# Patient Record
Sex: MALE | Race: WHITE | NOT HISPANIC OR LATINO | Employment: UNEMPLOYED | URBAN - METROPOLITAN AREA
[De-identification: names, ages, dates, MRNs, and addresses within clinical notes are randomized per-mention and may not be internally consistent; named-entity substitution may affect disease eponyms.]

---

## 2024-11-12 ENCOUNTER — APPOINTMENT (OUTPATIENT)
Dept: OTOLARYNGOLOGY | Facility: CLINIC | Age: 54
End: 2024-11-12
Payer: COMMERCIAL

## 2024-11-12 ENCOUNTER — APPOINTMENT (OUTPATIENT)
Dept: AUDIOLOGY | Facility: CLINIC | Age: 54
End: 2024-11-12
Payer: COMMERCIAL

## 2024-11-19 ENCOUNTER — APPOINTMENT (OUTPATIENT)
Dept: AUDIOLOGY | Facility: CLINIC | Age: 54
End: 2024-11-19
Payer: COMMERCIAL

## 2024-11-19 ENCOUNTER — APPOINTMENT (OUTPATIENT)
Dept: OTOLARYNGOLOGY | Facility: CLINIC | Age: 54
End: 2024-11-19
Payer: COMMERCIAL

## 2024-11-19 VITALS — BODY MASS INDEX: 27.77 KG/M2 | WEIGHT: 205 LBS | HEIGHT: 72 IN

## 2024-11-19 DIAGNOSIS — H65.22 SIMPLE CHRONIC SEROUS OTITIS MEDIA OF LEFT EAR: Primary | ICD-10-CM

## 2024-11-19 DIAGNOSIS — H69.92 DYSFUNCTION OF LEFT EUSTACHIAN TUBE: ICD-10-CM

## 2024-11-19 DIAGNOSIS — J34.89 NASAL SEPTAL PERFORATION: ICD-10-CM

## 2024-11-19 DIAGNOSIS — H90.3 SENSORINEURAL HEARING LOSS (SNHL) OF BOTH EARS: Primary | ICD-10-CM

## 2024-11-19 PROCEDURE — 99203 OFFICE O/P NEW LOW 30 MIN: CPT | Performed by: PHYSICIAN ASSISTANT

## 2024-11-19 PROCEDURE — 92557 COMPREHENSIVE HEARING TEST: CPT | Performed by: AUDIOLOGIST

## 2024-11-19 PROCEDURE — 3008F BODY MASS INDEX DOCD: CPT | Performed by: PHYSICIAN ASSISTANT

## 2024-11-19 PROCEDURE — 92550 TYMPANOMETRY & REFLEX THRESH: CPT | Performed by: AUDIOLOGIST

## 2024-11-19 RX ORDER — DICYCLOMINE HYDROCHLORIDE 20 MG/1
20 TABLET ORAL
COMMUNITY
Start: 2024-07-31

## 2024-11-19 RX ORDER — HYDROXYZINE PAMOATE 50 MG/1
50 CAPSULE ORAL 3 TIMES DAILY PRN
COMMUNITY
Start: 2024-11-15

## 2024-11-19 RX ORDER — MULTIVITAMIN
1 TABLET ORAL DAILY
COMMUNITY
Start: 2024-10-25

## 2024-11-19 RX ORDER — QUETIAPINE FUMARATE 100 MG/1
100 TABLET, FILM COATED ORAL NIGHTLY
COMMUNITY
Start: 2024-11-17

## 2024-11-19 RX ORDER — MIRTAZAPINE 30 MG/1
30 TABLET, FILM COATED ORAL NIGHTLY
COMMUNITY
Start: 2024-11-18

## 2024-11-19 RX ORDER — IBUPROFEN 800 MG/1
800 TABLET ORAL 3 TIMES DAILY
COMMUNITY
Start: 2024-10-11

## 2024-11-19 RX ORDER — BACLOFEN 20 MG/1
20 TABLET ORAL 3 TIMES DAILY
COMMUNITY
Start: 2024-11-18

## 2024-11-19 RX ORDER — ACETAMINOPHEN, DIPHENHYDRAMINE HCL, PHENYLEPHRINE HCL 325; 25; 5 MG/1; MG/1; MG/1
1 TABLET ORAL NIGHTLY
COMMUNITY
Start: 2024-11-18

## 2024-11-19 RX ORDER — PREDNISONE 20 MG/1
TABLET ORAL
Qty: 9 TABLET | Refills: 0 | Status: SHIPPED | OUTPATIENT
Start: 2024-11-19

## 2024-11-19 RX ORDER — ERGOCALCIFEROL 1.25 1/1
50000 CAPSULE ORAL WEEKLY
COMMUNITY
Start: 2024-09-11

## 2024-11-19 RX ORDER — FERROUS SULFATE 325(65) MG
325 TABLET ORAL
COMMUNITY
Start: 2024-10-17

## 2024-11-19 RX ORDER — FLUTICASONE PROPIONATE 50 MCG
2 SPRAY, SUSPENSION (ML) NASAL DAILY
Qty: 16 G | Refills: 11 | Status: SHIPPED | OUTPATIENT
Start: 2024-11-19 | End: 2025-11-19

## 2024-11-19 RX ORDER — TAMSULOSIN HYDROCHLORIDE 0.4 MG/1
0.4 CAPSULE ORAL DAILY
COMMUNITY
Start: 2024-10-30

## 2024-11-19 RX ORDER — CLONIDINE HYDROCHLORIDE 0.1 MG/1
0.1 TABLET ORAL 2 TIMES DAILY
COMMUNITY
Start: 2024-07-31

## 2024-11-19 RX ORDER — DOCUSATE SODIUM 100 MG/1
100 CAPSULE, LIQUID FILLED ORAL 2 TIMES DAILY PRN
COMMUNITY
Start: 2024-09-17

## 2024-11-19 RX ORDER — FOLIC ACID 1 MG/1
1 TABLET ORAL DAILY
COMMUNITY
Start: 2024-10-25

## 2024-11-19 RX ORDER — ONDANSETRON HYDROCHLORIDE 8 MG/1
8 TABLET, FILM COATED ORAL EVERY 8 HOURS PRN
COMMUNITY
Start: 2024-07-31

## 2024-11-19 RX ORDER — THIAMINE HCL 100 MG
100 TABLET ORAL DAILY
COMMUNITY
Start: 2024-07-31

## 2024-11-19 NOTE — PROGRESS NOTES
COMPREHENSIVE AUDIOMETRIC EVALUATION      Name:  Nicko Munguia  :  1970  Age:  54 y.o.  Date of Evaluation:  24   Referring Provider:  Man Chance PA-C     History:  Mr. Munguia was seen today for an evaluation of hearing.  Patient reported concerns for tympanic membrane ruptures, aural fullness, and tinnitus, all bilaterally.  Patient did additionally report medical hx significant for possible kidney failure.  Patient is unsure if hearing has been affected by middle ear invovlement. When asked, patient denied otalgia.    See audiometric evaluation at end of this report or scanned under media tab    OTOSCOPY:       Right Ear: Clear but red canal       Left Ear: Minimal non-occluding cerumen    226 Hz TYMPANOMETRY:       Right Ear: Type Ad: hypercompliant with normal peak pressure and ear canal volume       Left Ear: Type C: Negative pressure with normal compliance and ear canal volume, consistent with eustachian tube dysfunction    AUDIOMETRIC EVALUATION (Phones):       Right Ear: Normal through 4000 Hz sloping to Mild Sensorineural hearing loss                 Left Ear: Essentially flat Mild, Sensorineural hearing loss           Test technique:  Standard Audiometry  Reliability:   good    SPEECH RECOGNITION THRESHOLD:       Right Ear:  10 dBHL in good agreement with PTA       Left Ear:  20 dBHL in good agreement with PTA    WORD RECOGNITION:       Right Ear:  excellent (100%) at normal presentation level       Left Ear:  excellent (100%) at elevated presentation level    IPSILATERAL ACOUSTIC REFLEXES:       Right Ear:  WNL from 500-4000 Hz, consistent with patient's hearing sensitivity       Left Ear:    Absent from 500-2000 Hz consistent with ME involvement    DISCUSSION:   Discussed results and recommendations with patient.  Questions were addressed and the patient was encouraged to contact our department should concerns arise.    RECOMMENDATIONS:  -Recommend patient return should concerns for  changes in hearing sensitivity arise or as medically indicated.     Sean Lemon, CCC-A     Appt: 2:00 - 2:30 PM

## 2024-11-19 NOTE — PROGRESS NOTES
Nicko Munguia is a 54 y.o. year old male patient with Hearing Loss     Patient presents to the office today for assessment of his ears.  For the last several weeks patient states that he has been experiencing pressure fullness blockage sensation in the left ear.  He states that he was diagnosed with cerumen impaction and possible otitis media and treated with oral antibiotics.  He is here today for further assessment.  The patient is working on smoking cessation but does still do chewing tobacco.  He is also a former user of cocaine with history of septal perforation.  He states that he is 6 months sober.  He is without other ENT related concerns at this time.      Review of Systems   All other systems reviewed and are negative.        Physical Exam:   General appearance: No acute distress. Normal facies. Symmetric facial movement. No gross lesions of the face are noted.  The external ear structures appear normal.  Patient with partially occluding cerumen left ear removed with curette.  Tympanic membrane visualized and there appears to be small effusion inferiorly noted.  Examination of the right ear is unremarkable. There is no evidence of middle ear effusion or abnormality of the external auditory canal, tympanic membrane, and external ear itself.  Patient with nasal septal perforation moderate in size.. Examination is noted for normal healthy mucosal membranes without any evidence of lesions, polyps, or exudate. The tongue is normally mobile. There are no lesions on the gingiva, buccal, or oral mucosa. There are no oral cavity masses.  The neck is negative for mass lymphadenopathy. The trachea and parotid are clear. The thyroid bed is grossly unremarkable. The salivary gland structures are grossly unremarkable.    Audiogram demonstrates essentially normal hearing up to 4000 Hz then with a mild high-frequency loss.  Left ear with essentially flat mild loss with subtle conductive component and type C  tympanometry    Assessment/Plan   1.  Serous otitis media left ear  2. ETD  3.  Nasal septal perforation    Patient seen in the office today for assessment of ears.  Patient with serous otitis media which appears to be resolving in the left ear.  I am going to place the patient on oral prednisone taper as well as nasal steroids.  I will see him back in 6 weeks for follow-up.

## 2024-12-19 ENCOUNTER — APPOINTMENT (OUTPATIENT)
Dept: OTOLARYNGOLOGY | Facility: CLINIC | Age: 54
End: 2024-12-19
Payer: COMMERCIAL

## 2024-12-19 DIAGNOSIS — H69.92 DYSFUNCTION OF LEFT EUSTACHIAN TUBE: Primary | ICD-10-CM

## 2024-12-19 PROCEDURE — 99213 OFFICE O/P EST LOW 20 MIN: CPT | Performed by: PHYSICIAN ASSISTANT

## 2024-12-19 PROCEDURE — 31231 NASAL ENDOSCOPY DX: CPT | Performed by: PHYSICIAN ASSISTANT

## 2024-12-19 NOTE — PROGRESS NOTES
Nicko Munguia is a 54 y.o. year old male patient with history of eustachian tube dysfunction with serous otitis media left ear.  Patient states that he had been doing well but over the last week or so felt that there was some congestion returning in the left ear with some popping cracking phenomenon.  The patient states that he has also noted that after showering or taking a bath and getting water in the ear the ear felt more muffled after these incidences.  He is here today denying pain.  He does utilize the nasal steroid spray.  The patient does have history of tobacco use and states that he had previously utilize chewing tobacco and is now smoking cigarettes.  He does have prior extensive history of cannabis use.  The patient also has a prior history of snorting illicit drugs with septal perforation.  All other ENT issues are negative.         Review of Systems   All other systems reviewed and are negative.        Physical Exam:   General appearance: No acute distress. Normal facies. Symmetric facial movement. No gross lesions of the face are noted.  The external ear structures appear normal. The ear canals patent and the tympanic membranes are intact without evidence of air-fluid levels, retraction, or congenital defects.  Anterior rhinoscopy notes essentially a midline nasal septum. Examination is noted for normal healthy mucosal membranes without any evidence of lesions, polyps, or exudate. The tongue is normally mobile. There are no lesions on the gingiva, buccal, or oral mucosa. There are no oral cavity masses.  The neck is negative for mass lymphadenopathy. The trachea and parotid are clear. The thyroid bed is grossly unremarkable. The salivary gland structures are grossly unremarkable.     After topical anesthesia, a very complete nasal endoscopy was performed. This examination reveals a normal appearance to the turbinates, septum, and middle meatal regions, except as noted. There is no fabian purulence,  polypoid disease, or obvious mass.    Assessment/Plan   1.  Eustachian tube dysfunction  Patient seen in the office today for assessment and follow-up on left ear.  There is improved appearance with no evidence of effusion.  Patient still symptomatic with eustachian tube dysfunction.  I recommend continued use of nasal steroids and discontinuing smoking.  The patient will follow-up in 3 months.

## 2025-03-20 ENCOUNTER — APPOINTMENT (OUTPATIENT)
Dept: OTOLARYNGOLOGY | Facility: CLINIC | Age: 55
End: 2025-03-20
Payer: COMMERCIAL